# Patient Record
Sex: FEMALE | Race: WHITE | NOT HISPANIC OR LATINO | Employment: FULL TIME | URBAN - METROPOLITAN AREA
[De-identification: names, ages, dates, MRNs, and addresses within clinical notes are randomized per-mention and may not be internally consistent; named-entity substitution may affect disease eponyms.]

---

## 2018-06-07 PROCEDURE — 99283 EMERGENCY DEPT VISIT LOW MDM: CPT

## 2018-06-08 ENCOUNTER — HOSPITAL ENCOUNTER (EMERGENCY)
Facility: CLINIC | Age: 33
Discharge: HOME | End: 2018-06-08
Attending: EMERGENCY MEDICINE | Admitting: EMERGENCY MEDICINE
Payer: COMMERCIAL

## 2018-06-08 VITALS
HEIGHT: 65 IN | RESPIRATION RATE: 20 BRPM | SYSTOLIC BLOOD PRESSURE: 128 MMHG | BODY MASS INDEX: 22.66 KG/M2 | OXYGEN SATURATION: 97 % | DIASTOLIC BLOOD PRESSURE: 89 MMHG | WEIGHT: 136 LBS | TEMPERATURE: 97.8 F

## 2018-06-08 DIAGNOSIS — T78.40XA ALLERGIC REACTION, INITIAL ENCOUNTER: ICD-10-CM

## 2018-06-08 PROCEDURE — 25000132 ZZH RX MED GY IP 250 OP 250 PS 637: Performed by: EMERGENCY MEDICINE

## 2018-06-08 PROCEDURE — 25000131 ZZH RX MED GY IP 250 OP 636 PS 637: Performed by: EMERGENCY MEDICINE

## 2018-06-08 RX ORDER — EPINEPHRINE 0.3 MG/.3ML
0.3 INJECTION SUBCUTANEOUS
Qty: 0.6 ML | Refills: 0 | Status: SHIPPED | OUTPATIENT
Start: 2018-06-08

## 2018-06-08 RX ORDER — DIPHENHYDRAMINE HCL 25 MG
50 CAPSULE ORAL ONCE
Status: COMPLETED | OUTPATIENT
Start: 2018-06-08 | End: 2018-06-08

## 2018-06-08 RX ADMIN — DEXAMETHASONE 10 MG: 2 TABLET ORAL at 00:25

## 2018-06-08 RX ADMIN — DIPHENHYDRAMINE HYDROCHLORIDE 50 MG: 25 CAPSULE ORAL at 00:28

## 2018-06-08 RX ADMIN — RANITIDINE 150 MG: 150 TABLET ORAL at 00:25

## 2018-06-08 ASSESSMENT — ENCOUNTER SYMPTOMS
TROUBLE SWALLOWING: 1
SORE THROAT: 0

## 2018-06-08 NOTE — ED PROVIDER NOTES
"  History     Chief Complaint:  Oral swelling    HPI   Ainsley Tran is a 33 year old female who presents to the emergency department for evaluation of oral swelling. The patient reports that this morning she woke up with very swollen eyes but that it improved and is almost back to baseline at this point. Then, for the last three hours she experienced the onset of throat tightness and difficulty swallowing. The patient is currently breastfeeding a 13 month year old. She denies throat pain, seasonal allergies, any history of allergic reactions or chance of pregnancy. She notes she has not taken her Lexapro for the past two days.     Allergies:  No Known Allergies     Medications:    Epinephrine  Lexapro     Past Medical History:    Anxiety    Past Surgical History:        Family History:    No family history on file.    Social History:  Current every day smoker  Negative for alcohol use.      Review of Systems   HENT: Positive for trouble swallowing. Negative for sore throat.         Positive for Oral and Eye swelling   All other systems reviewed and are negative.      Physical Exam   First Vitals:  BP: 128/89  Heart Rate: 78  Temp: 97.8  F (36.6  C)  Resp: 20  Height: 165.1 cm (5' 5\")  Weight: 61.7 kg (136 lb)  SpO2: 100 %      Physical Exam  Constitutional:  Oriented to person, place, and time. Well appearing, normal.  HENT:    No angioedema.  Head:    Normocephalic.   Mouth/Throat:   Oropharynx is clear and moist.   Eyes:    EOM are normal. Pupils are equal, round, and reactive to light.   Neck:    Neck supple.   Cardiovascular:  Normal rate, regular rhythm and normal heart sounds.      Exam reveals no gallop and no friction rub.       No murmur heard.  Pulmonary/Chest:  Effort normal and breath sounds normal.      No respiratory distress. No wheezes. No rales.      No reproducible chest wall pain.  Abdominal:   Soft. No distension. No tenderness. No rebound and no guarding. "   Musculoskeletal:  Normal range of motion.   Neurological:   Alert and oriented to person, place, and time.           Moves all 4 extremities spontaneously    Skin:    No rash noted. No pallor.     Emergency Department Course   Interventions:  0025 Decadron 10 mg tablet PO  0025 Zantac 150 mg tablet PO  0028 Benadryl 50 mg PO     Emergency Department Course:  0013 Nursing notes and vitals reviewed. I performed an exam of the patient as documented above.     Medicine administered as documented above.     0110 I rechecked the patient and discussed the results of her workup thus far.     Findings and plan explained to the Patient. Patient discharged home with instructions regarding supportive care, medications, and reasons to return. The importance of close follow-up was reviewed. The patient was prescribed epinephrine.      Impression & Plan    Medical Decision Making:  Ainsley Tran is a 33 year old female came in with signs of an ongoing allergic reaction. I note no objective findings but after interventions she symptomatically feels greatly improved, not sure if her exposure is maybe environmental, she will be discharged with an epi pen and Benadryl and told to return with any worsening symptoms.      Diagnosis:    ICD-10-CM    1. Allergic reaction, initial encounter T78.40XA        Disposition:  discharged to home    Discharge Medications:  Discharge Medication List as of 6/8/2018  1:54 AM      START taking these medications    Details   EPINEPHrine (EPIPEN/ADRENACLICK/OR ANY BX GENERIC EQUIV) 0.3 MG/0.3ML injection 2-pack Inject 0.3 mLs (0.3 mg) into the muscle once as needed for anaphylaxis, Disp-0.6 mL, R-0, Local Print           Scribe Disclosure:  I, Harsha Schroeder, am serving as a scribe on 6/8/2018 at 12:13 AM to personally document services performed by Dr. Valentine MD based on my observations and the provider's statements to me.     Harsha Schroeder  6/7/2018   [facility] EMERGENCY  DEPARTMENT       Avery Grijalva MD  06/08/18 0682

## 2018-06-08 NOTE — ED TRIAGE NOTES
Pt awoke this am with bilateral eye edema but has resolved. 3 hours pt c/o throat tightness and oral swelling. Throat slightly reddened, no edema noted. ABC in tact. A/OX4

## 2018-06-08 NOTE — ED AVS SNAPSHOT
[facility] Emergency Department    [address] [place] [address]    Phone:  [phone]    Fax:  [phone]                                       Ainsley Tran   MRN: [id number]    Department:  [facility] Emergency Department   Date of Visit:  6/7/2018           Patient Information     Date Of Birth          1985        Your diagnoses for this visit were:     Allergic reaction, initial encounter        You were seen by Avery Grijalva MD.      Follow-up Information     Follow up with your doctor. Call in 1 week.        Discharge Instructions       Discharge Instructions  Allergic Reaction    An allergic reaction can result in a rash, itching, swelling, watery eyes, or a runny nose. A serious reaction can cause swelling of your mouth or throat, or wheezing. The most serious allergy is called analphylaxis, and can be life-threatening. Many allergies result in hives, also called urticaria.     An allergy happens when the body s natural defense system (immune system) overreacts to something harmless. The thing that triggers your allergic reaction is called an allergen. The first time you are exposed to your allergen, you may not have any reaction, but the body makes a protein called an antibody. The antibody lets the body recognize and remember the allergen.  Every time you are exposed to your allergen you get more antibody and your reaction can be more severe.      Call 911 if you have:    Swelling of the lips, tongue or throat.    Hoarse voice, drooling or trouble breathing.    Chest pain or shortness of breath.    Fainting or unconsciousness.    Return to the Emergency Department if:    You develop a fever.    You have significant abdominal pain.    You vomit more than once.    Your rash changes or looks very different.    What can I do to help myself?    If you know what caused your allergy, don t touch it, throw any of it away, and tell others not  to have it around you. Wear a medical alert bracelet with a name of your allergen on it.    If you don t know what you are allergic to, keep a journal of everything that you are exposed to (foods, soaps, medicines, etc.). Take this with you when you follow up with your primary doctor. This may help determine what is causing the allergic reaction.    Take any medicines that are prescribed.    Antihistamines can decrease rash or itching. You may use Benadryl  (diphenhydramine) for rash or itching according to package directions, or use a prescription antihistamine as recommended by your physician.    For significant allergic reactions, you may have been given an epinephrine (adrenaline) auto injector (EpiPen ). Carry this with you at all times! Use it if you are having any symptoms of anaphylaxis.  Do not be afraid to use it. Always call 911 if you use your EpiPen ! It is only meant to buy time until you can get to the Emergency Department!  If you were given a prescription for medicine here today, be sure to read all of the information (including the package insert) that comes with your prescription.  This will include important information about the medicine, its side effects, and any warnings that you need to know about.  The pharmacist who fills the prescription can provide more information and answer questions you may have about the medicine.  If you have questions or concerns that the pharmacist cannot address, please call or return to the Emergency Department.     Opioid Medication Information    Pain medications are among the most commonly prescribed medicines, so we are including this information for all our patients. If you did not receive pain medication or get a prescription for pain medicine, you can ignore it.     You may have been given a prescription for an opioid (narcotic) pain medicine and/or have received a pain medicine while here in the Emergency Department. These medicines can make you drowsy or  impaired. You must not drive, operate dangerous equipment, or engage in any other dangerous activities while taking these medications. If you drive while taking these medications, you could be arrested for DUI, or driving under the influence. Do not drink any alcohol while you are taking these medications.     Opioid pain medications can cause addiction. If you have a history of chemical dependency of any type, you are at a higher risk of becoming addicted to pain medications.  Only take these prescribed medications to treat your pain when all other options have been tried. Take it for as short a time and as few doses as possible. Store your pain pills in a secure place, as they are frequently stolen and provide a dangerous opportunity for children or visitors in your house to start abusing these powerful medications. We will not replace any lost or stolen medicine.  As soon as your pain is better, you should flush all your remaining medication.     Many prescription pain medications contain Tylenol  (acetaminophen), including Vicodin , Tylenol #3 , Norco , Lortab , and Percocet .  You should not take any extra pills of Tylenol  if you are using these prescription medications or you can get very sick.  Do not ever take more than 3000 mg of acetaminophen in any 24 hour period.    All opioids tend to cause constipation. Drink plenty of water and eat foods that have a lot of fiber, such as fruits, vegetables, prune juice, apple juice and high fiber cereal.  Take a laxative if you don t move your bowels at least every other day. Miralax , Milk of Magnesia, Colace , or Senna  can be used to keep you regular.      Remember that you can always come back to the Emergency Department if you are not able to see your regular doctor in the amount of time listed above, if you get any new symptoms, or if there is anything that worries you.        24 Hour Appointment Hotline       To make an appointment at any [facility], call  [phone] ([phone]). If you don't have a family doctor or clinic, we will help you find one. [facility] clinics are conveniently located to serve the needs of you and your family.             Review of your medicines      START taking        Dose / Directions Last dose taken    EPINEPHrine 0.3 MG/0.3ML injection 2-pack   Commonly known as:  EPIPEN/ADRENACLICK/or ANY BX GENERIC EQUIV   Dose:  0.3 mg   Quantity:  0.6 mL        Inject 0.3 mLs (0.3 mg) into the muscle once as needed for anaphylaxis   Refills:  0          Our records show that you are taking the medicines listed below. If these are incorrect, please call your family doctor or clinic.        Dose / Directions Last dose taken    LEXAPRO PO        Refills:  0                Prescriptions were sent or printed at these locations (1 Prescription)                   Other Prescriptions                Printed at Department/Unit printer (1 of 1)         EPINEPHrine (EPIPEN/ADRENACLICK/OR ANY BX GENERIC EQUIV) 0.3 MG/0.3ML injection 2-pack                Orders Needing Specimen Collection     None      Pending Results     No orders found from 6/6/2018 to 6/9/2018.            Pending Culture Results     No orders found from 6/6/2018 to 6/9/2018.            Pending Results Instructions     If you had any lab results that were not finalized at the time of your Discharge, you can call the ED Lab Result RN at [phone]. You will be contacted by this team for any positive Lab results or changes in treatment. The nurses are available 7 days a week from 10A to 6:30P.  You can leave a message 24 hours per day and they will return your call.        Test Results From Your Hospital Stay               Clinical Quality Measure: Blood Pressure Screening     Your blood pressure was checked while you were in the emergency department today. The last reading we obtained was  BP: 128/89 . Please read the guidelines below about what these numbers mean and what you should  "do about them.  If your systolic blood pressure (the top number) is less than 120 and your diastolic blood pressure (the bottom number) is less than 80, then your blood pressure is normal. There is nothing more that you need to do about it.  If your systolic blood pressure (the top number) is 120-139 or your diastolic blood pressure (the bottom number) is 80-89, your blood pressure may be higher than it should be. You should have your blood pressure rechecked within a year by a primary care provider.  If your systolic blood pressure (the top number) is 140 or greater or your diastolic blood pressure (the bottom number) is 90 or greater, you may have high blood pressure. High blood pressure is treatable, but if left untreated over time it can put you at risk for heart attack, stroke, or kidney failure. You should have your blood pressure rechecked by a primary care provider within the next 4 weeks.  If your provider in the emergency department today gave you specific instructions to follow-up with your doctor or provider even sooner than that, you should follow that instruction and not wait for up to 4 weeks for your follow-up visit.        Thank you for choosing [facility]       Thank you for choosing [facility] for your care. Our goal is always to provide you with excellent care. Hearing back from our patients is one way we can continue to improve our services. Please take a few minutes to complete the written survey that you may receive in the mail after you visit with us. Thank you!        [email or web address] Information     [employer] lets you send messages to your doctor, view your test results, renew your prescriptions, schedule appointments and more. To sign up, go to [email or web address] . Click on \"Log in\" on the left side of the screen, which will take you to the Welcome page. Then click on \"Sign up Now\" on the right side of the page.     You will be asked to enter the access code listed below, as well as some personal " information. Please follow the directions to create your username and password.     Your access code is: [email or web address]  Expires: 2018  1:52 AM     Your access code will  in 90 days. If you need help or a new code, please call your [facility] clinic or [phone].        Care EveryWhere ID     This is your Care EveryWhere ID. This could be used by other organizations to access your [facility] medical records  [id number]        Equal Access to Services     REBECA BUENO : Hadalma edwardo Somerrill, waaxda luqadaha, qaybta kaalmada adeleonel, maritza enciso. So [facility] [phone].    ATENCIÓN: Si habla español, tiene a paredes disposición servicios gratuitos de asistencia lingüística. Llame al [phone].    We comply with applicable federal civil rights laws and [place] laws. We do not discriminate on the basis of race, color, national origin, age, disability, sex, sexual orientation, or gender identity.            After Visit Summary       This is your record. Keep this with you and show to your community pharmacist(s) and doctor(s) at your next visit.

## 2018-06-08 NOTE — ED AVS SNAPSHOT
[facility] Emergency Department    [address]    [facility] [address]    Phone:  [phone]    Fax:  [phone]                                       Ainsley Tran   MRN: [id number]    Department:  [facility] Emergency Department   Date of Visit:  6/7/2018           After Visit Summary Signature Page     I have received my discharge instructions, and my questions have been answered. I have discussed any challenges I see with this plan with the nurse or doctor.    ..........................................................................................................................................  Patient/Patient Representative Signature      ..........................................................................................................................................  Patient Representative Print Name and Relationship to Patient    ..................................................               ................................................  Date                                            Time    ..........................................................................................................................................  Reviewed by Signature/Title    ...................................................              ..............................................  Date                                                            Time

## 2024-07-02 NOTE — DISCHARGE INSTRUCTIONS
Discharge Instructions  Allergic Reaction    An allergic reaction can result in a rash, itching, swelling, watery eyes, or a runny nose. A serious reaction can cause swelling of your mouth or throat, or wheezing. The most serious allergy is called analphylaxis, and can be life-threatening. Many allergies result in hives, also called urticaria.     An allergy happens when the body s natural defense system (immune system) overreacts to something harmless. The thing that triggers your allergic reaction is called an allergen. The first time you are exposed to your allergen, you may not have any reaction, but the body makes a protein called an antibody. The antibody lets the body recognize and remember the allergen.  Every time you are exposed to your allergen you get more antibody and your reaction can be more severe.      Call 911 if you have:    Swelling of the lips, tongue or throat.    Hoarse voice, drooling or trouble breathing.    Chest pain or shortness of breath.    Fainting or unconsciousness.    Return to the Emergency Department if:    You develop a fever.    You have significant abdominal pain.    You vomit more than once.    Your rash changes or looks very different.    What can I do to help myself?    If you know what caused your allergy, don t touch it, throw any of it away, and tell others not to have it around you. Wear a medical alert bracelet with a name of your allergen on it.    If you don t know what you are allergic to, keep a journal of everything that you are exposed to (foods, soaps, medicines, etc.). Take this with you when you follow up with your primary doctor. This may help determine what is causing the allergic reaction.    Take any medicines that are prescribed.    Antihistamines can decrease rash or itching. You may use Benadryl  (diphenhydramine) for rash or itching according to package directions, or use a prescription antihistamine as recommended by your physician.    For significant  allergic reactions, you may have been given an epinephrine (adrenaline) auto injector (EpiPen ). Carry this with you at all times! Use it if you are having any symptoms of anaphylaxis.  Do not be afraid to use it. Always call 911 if you use your EpiPen ! It is only meant to buy time until you can get to the Emergency Department!  If you were given a prescription for medicine here today, be sure to read all of the information (including the package insert) that comes with your prescription.  This will include important information about the medicine, its side effects, and any warnings that you need to know about.  The pharmacist who fills the prescription can provide more information and answer questions you may have about the medicine.  If you have questions or concerns that the pharmacist cannot address, please call or return to the Emergency Department.     Opioid Medication Information    Pain medications are among the most commonly prescribed medicines, so we are including this information for all our patients. If you did not receive pain medication or get a prescription for pain medicine, you can ignore it.     You may have been given a prescription for an opioid (narcotic) pain medicine and/or have received a pain medicine while here in the Emergency Department. These medicines can make you drowsy or impaired. You must not drive, operate dangerous equipment, or engage in any other dangerous activities while taking these medications. If you drive while taking these medications, you could be arrested for DUI, or driving under the influence. Do not drink any alcohol while you are taking these medications.     Opioid pain medications can cause addiction. If you have a history of chemical dependency of any type, you are at a higher risk of becoming addicted to pain medications.  Only take these prescribed medications to treat your pain when all other options have been tried. Take it for as short a time and as few  doses as possible. Store your pain pills in a secure place, as they are frequently stolen and provide a dangerous opportunity for children or visitors in your house to start abusing these powerful medications. We will not replace any lost or stolen medicine.  As soon as your pain is better, you should flush all your remaining medication.     Many prescription pain medications contain Tylenol  (acetaminophen), including Vicodin , Tylenol #3 , Norco , Lortab , and Percocet .  You should not take any extra pills of Tylenol  if you are using these prescription medications or you can get very sick.  Do not ever take more than 3000 mg of acetaminophen in any 24 hour period.    All opioids tend to cause constipation. Drink plenty of water and eat foods that have a lot of fiber, such as fruits, vegetables, prune juice, apple juice and high fiber cereal.  Take a laxative if you don t move your bowels at least every other day. Miralax , Milk of Magnesia, Colace , or Senna  can be used to keep you regular.      Remember that you can always come back to the Emergency Department if you are not able to see your regular doctor in the amount of time listed above, if you get any new symptoms, or if there is anything that worries you.       Detail Level: Detailed Quality 130: Documentation Of Current Medications In The Medical Record: Current Medications Documented

## 2024-08-23 ENCOUNTER — APPOINTMENT (OUTPATIENT)
Dept: MRI IMAGING | Facility: HOSPITAL | Age: 39
End: 2024-08-23
Attending: EMERGENCY MEDICINE
Payer: COMMERCIAL

## 2024-08-23 ENCOUNTER — HOSPITAL ENCOUNTER (EMERGENCY)
Facility: HOSPITAL | Age: 39
Discharge: HOME | End: 2024-08-23
Attending: EMERGENCY MEDICINE | Admitting: EMERGENCY MEDICINE
Payer: COMMERCIAL

## 2024-08-23 VITALS
HEART RATE: 70 BPM | BODY MASS INDEX: 25.99 KG/M2 | DIASTOLIC BLOOD PRESSURE: 64 MMHG | HEIGHT: 65 IN | WEIGHT: 156 LBS | RESPIRATION RATE: 16 BRPM | TEMPERATURE: 97 F | SYSTOLIC BLOOD PRESSURE: 100 MMHG | OXYGEN SATURATION: 95 %

## 2024-08-23 DIAGNOSIS — R20.2 PARESTHESIA: ICD-10-CM

## 2024-08-23 LAB
ALBUMIN UR-MCNC: NEGATIVE MG/DL
ANION GAP SERPL CALCULATED.3IONS-SCNC: 11 MMOL/L (ref 7–15)
APPEARANCE UR: CLEAR
BACTERIA #/AREA URNS HPF: ABNORMAL /HPF
BASOPHILS # BLD AUTO: 0 10E3/UL (ref 0–0.2)
BASOPHILS NFR BLD AUTO: 1 %
BILIRUB UR QL STRIP: NEGATIVE
BUN SERPL-MCNC: 10.8 MG/DL (ref 6–20)
CALCIUM SERPL-MCNC: 9.3 MG/DL (ref 8.8–10.4)
CHLORIDE SERPL-SCNC: 101 MMOL/L (ref 98–107)
COLOR UR AUTO: COLORLESS
CREAT SERPL-MCNC: 1.05 MG/DL (ref 0.51–0.95)
EGFRCR SERPLBLD CKD-EPI 2021: 70 ML/MIN/1.73M2
EOSINOPHIL # BLD AUTO: 0.2 10E3/UL (ref 0–0.7)
EOSINOPHIL NFR BLD AUTO: 2 %
ERYTHROCYTE [DISTWIDTH] IN BLOOD BY AUTOMATED COUNT: 12.9 % (ref 10–15)
GLUCOSE SERPL-MCNC: 133 MG/DL (ref 70–99)
GLUCOSE UR STRIP-MCNC: NEGATIVE MG/DL
HCO3 SERPL-SCNC: 29 MMOL/L (ref 22–29)
HCT VFR BLD AUTO: 42.1 % (ref 35–47)
HGB BLD-MCNC: 14.8 G/DL (ref 11.7–15.7)
HGB UR QL STRIP: NEGATIVE
HOLD SPECIMEN: NORMAL
HOLD SPECIMEN: NORMAL
IMM GRANULOCYTES # BLD: 0 10E3/UL
IMM GRANULOCYTES NFR BLD: 1 %
KETONES UR STRIP-MCNC: NEGATIVE MG/DL
LEUKOCYTE ESTERASE UR QL STRIP: NEGATIVE
LYMPHOCYTES # BLD AUTO: 2.9 10E3/UL (ref 0.8–5.3)
LYMPHOCYTES NFR BLD AUTO: 43 %
MCH RBC QN AUTO: 32.3 PG (ref 26.5–33)
MCHC RBC AUTO-ENTMCNC: 35.2 G/DL (ref 31.5–36.5)
MCV RBC AUTO: 92 FL (ref 78–100)
MONOCYTES # BLD AUTO: 0.4 10E3/UL (ref 0–1.3)
MONOCYTES NFR BLD AUTO: 6 %
NEUTROPHILS # BLD AUTO: 3.1 10E3/UL (ref 1.6–8.3)
NEUTROPHILS NFR BLD AUTO: 48 %
NITRATE UR QL: NEGATIVE
NRBC # BLD AUTO: 0 10E3/UL
NRBC BLD AUTO-RTO: 0 /100
PH UR STRIP: 8 [PH] (ref 5–7)
PLATELET # BLD AUTO: 255 10E3/UL (ref 150–450)
POTASSIUM SERPL-SCNC: 3.7 MMOL/L (ref 3.4–5.3)
RBC # BLD AUTO: 4.58 10E6/UL (ref 3.8–5.2)
RBC URINE: <1 /HPF
SODIUM SERPL-SCNC: 141 MMOL/L (ref 135–145)
SP GR UR STRIP: 1.01 (ref 1–1.03)
SQUAMOUS EPITHELIAL: 1 /HPF
TROPONIN T SERPL HS-MCNC: 14 NG/L
UROBILINOGEN UR STRIP-MCNC: <2 MG/DL
WBC # BLD AUTO: 6.6 10E3/UL (ref 4–11)
WBC URINE: <1 /HPF

## 2024-08-23 PROCEDURE — 82746 ASSAY OF FOLIC ACID SERUM: CPT | Performed by: EMERGENCY MEDICINE

## 2024-08-23 PROCEDURE — 84484 ASSAY OF TROPONIN QUANT: CPT | Performed by: STUDENT IN AN ORGANIZED HEALTH CARE EDUCATION/TRAINING PROGRAM

## 2024-08-23 PROCEDURE — A9585 GADOBUTROL INJECTION: HCPCS | Performed by: EMERGENCY MEDICINE

## 2024-08-23 PROCEDURE — 80048 BASIC METABOLIC PNL TOTAL CA: CPT | Performed by: STUDENT IN AN ORGANIZED HEALTH CARE EDUCATION/TRAINING PROGRAM

## 2024-08-23 PROCEDURE — 99285 EMERGENCY DEPT VISIT HI MDM: CPT | Mod: 25

## 2024-08-23 PROCEDURE — 84484 ASSAY OF TROPONIN QUANT: CPT | Performed by: EMERGENCY MEDICINE

## 2024-08-23 PROCEDURE — 85025 COMPLETE CBC W/AUTO DIFF WBC: CPT | Performed by: EMERGENCY MEDICINE

## 2024-08-23 PROCEDURE — 70544 MR ANGIOGRAPHY HEAD W/O DYE: CPT | Mod: XU

## 2024-08-23 PROCEDURE — 81001 URINALYSIS AUTO W/SCOPE: CPT | Performed by: EMERGENCY MEDICINE

## 2024-08-23 PROCEDURE — 85025 COMPLETE CBC W/AUTO DIFF WBC: CPT | Performed by: STUDENT IN AN ORGANIZED HEALTH CARE EDUCATION/TRAINING PROGRAM

## 2024-08-23 PROCEDURE — 70549 MR ANGIOGRAPH NECK W/O&W/DYE: CPT

## 2024-08-23 PROCEDURE — 93005 ELECTROCARDIOGRAM TRACING: CPT | Performed by: STUDENT IN AN ORGANIZED HEALTH CARE EDUCATION/TRAINING PROGRAM

## 2024-08-23 PROCEDURE — 36415 COLL VENOUS BLD VENIPUNCTURE: CPT | Performed by: EMERGENCY MEDICINE

## 2024-08-23 PROCEDURE — 36415 COLL VENOUS BLD VENIPUNCTURE: CPT | Performed by: STUDENT IN AN ORGANIZED HEALTH CARE EDUCATION/TRAINING PROGRAM

## 2024-08-23 PROCEDURE — 84207 ASSAY OF VITAMIN B-6: CPT | Performed by: EMERGENCY MEDICINE

## 2024-08-23 PROCEDURE — 255N000002 HC RX 255 OP 636: Performed by: EMERGENCY MEDICINE

## 2024-08-23 PROCEDURE — 80048 BASIC METABOLIC PNL TOTAL CA: CPT | Performed by: EMERGENCY MEDICINE

## 2024-08-23 PROCEDURE — 70553 MRI BRAIN STEM W/O & W/DYE: CPT

## 2024-08-23 PROCEDURE — 82607 VITAMIN B-12: CPT | Performed by: EMERGENCY MEDICINE

## 2024-08-23 PROCEDURE — 93005 ELECTROCARDIOGRAM TRACING: CPT | Performed by: EMERGENCY MEDICINE

## 2024-08-23 RX ORDER — GADOBUTROL 604.72 MG/ML
7.5 INJECTION INTRAVENOUS ONCE
Status: COMPLETED | OUTPATIENT
Start: 2024-08-23 | End: 2024-08-23

## 2024-08-23 RX ADMIN — GADOBUTROL 7.5 ML: 604.72 INJECTION INTRAVENOUS at 21:15

## 2024-08-23 ASSESSMENT — ENCOUNTER SYMPTOMS
DIFFICULTY URINATING: 0
FACIAL SWELLING: 1
CONSTIPATION: 0
HEADACHES: 1
DIZZINESS: 1
APPETITE CHANGE: 0
NUMBNESS: 1
FATIGUE: 1
FEVER: 0
DYSURIA: 0
COUGH: 0
FREQUENCY: 0
CONFUSION: 1
HEMATURIA: 0
WEAKNESS: 1
CHILLS: 0
DIARRHEA: 0
SPEECH DIFFICULTY: 0

## 2024-08-23 ASSESSMENT — COLUMBIA-SUICIDE SEVERITY RATING SCALE - C-SSRS
6. HAVE YOU EVER DONE ANYTHING, STARTED TO DO ANYTHING, OR PREPARED TO DO ANYTHING TO END YOUR LIFE?: NO
1. IN THE PAST MONTH, HAVE YOU WISHED YOU WERE DEAD OR WISHED YOU COULD GO TO SLEEP AND NOT WAKE UP?: NO
2. HAVE YOU ACTUALLY HAD ANY THOUGHTS OF KILLING YOURSELF IN THE PAST MONTH?: NO

## 2024-08-23 ASSESSMENT — ACTIVITIES OF DAILY LIVING (ADL)
ADLS_ACUITY_SCORE: 35

## 2024-08-23 NOTE — ED TRIAGE NOTES
Pt reports 6 days weakness, also transient Numbness tingling to arms and legs when sleeping, pt reports dizziness and shortness of breath also noted tongue swelling and partner notices mild bilat cheek swelling.  Pt denies difficulty swallowing.    Pt did have contact with mother who recently travelled to [place] and mother returned with weakness and cough.    Covid test negative at clinic today and negative test at home in past days.     Triage Assessment (Adult)       Row Name 08/23/24 3174          Triage Assessment    Airway WDL WDL        Respiratory WDL    Respiratory WDL WDL        Skin Circulation/Temperature WDL    Skin Circulation/Temperature WDL WDL        Cardiac WDL    Cardiac WDL WDL        Peripheral/Neurovascular WDL    Peripheral Neurovascular WDL WDL        Cognitive/Neuro/Behavioral WDL    Cognitive/Neuro/Behavioral WDL WDL

## 2024-08-24 LAB
ATRIAL RATE - MUSE: 78 BPM
DIASTOLIC BLOOD PRESSURE - MUSE: NORMAL MMHG
FOLATE SERPL-MCNC: 8.6 NG/ML (ref 4.6–34.8)
INTERPRETATION ECG - MUSE: NORMAL
P AXIS - MUSE: 51 DEGREES
PR INTERVAL - MUSE: 164 MS
QRS DURATION - MUSE: 84 MS
QT - MUSE: 374 MS
QTC - MUSE: 426 MS
R AXIS - MUSE: 64 DEGREES
SYSTOLIC BLOOD PRESSURE - MUSE: NORMAL MMHG
T AXIS - MUSE: 251 DEGREES
VENTRICULAR RATE- MUSE: 78 BPM
VIT B12 SERPL-MCNC: 569 PG/ML (ref 232–1245)

## 2024-08-24 NOTE — ED PROVIDER NOTES
EMERGENCY DEPARTMENT ENCOUNTER       ED Course & Medical Decision Making     7:22 PM I introduced myself to the patient, obtained patient history, performed a physical exam, and discussed plan for ED workup including potential diagnostic laboratory/imaging studies and interventions.      I saw and examined the patient.  She has a normal, nonfocal neurologic exam currently.  She states that right now she is not feeling any numbness and tingling but she did have areas of numbness on her upper and lower extremities migratory in nature over the last week or so.    I did send off B6, B12 and folate studies which are pending.      She has not had any significant signs of infection.  No fevers.  No neck stiffness or signs of meningitis.  This does seem to be neurologic in nature and I will send her for an MRI/MRI, however this returned unremarkable.  She did have a potential Chiari malformation noted but I do not feel that this explains her symptoms.  Her laboratory studies that I did get back returned unremarkable.      This is a reassuring workup from the emergency department but it does not explain her symptoms.  I do not feel that she requires admission but she should follow-up with neurology.  Consultation has been placed with referral.  I do feel that this can be done as an outpatient and she will be discharged to follow-up with neurology, however if any of her symptoms worsen or she develops new or worrisome symptoms she would return here for reevaluation    I recommended taking a multivitamin in the meantime until she is cleared by neurology    Medical Decision Making  Obtained supplemental history:Supplemental history obtained?: No  Reviewed external records: External records reviewed?: No  Care impacted by chronic illness:Other: hypothyroidism  Care significantly affected by social determinants of health:N/A  Did you consider but not order tests?: Work up considered but not performed and documented in chart, if  applicable  Did you interpret images independently?: Independent interpretation of ECG and images noted in documentation, when applicable.  Consultation discussion with other provider:Did you involve another provider (consultant, , pharmacy, etc.)?: No  Discharge. No recommendations on prescription strength medication(s). See documentation for any additional details.        Prior to making a final disposition on this patient the results of patient's tests and other diagnostic studies were discussed with the patient. All questions were answered. Patient expressed understanding of the plan and was amenable to it.    Medications   gadobutrol (GADAVIST) injection 7.5 mL (7.5 mLs Intravenous $Given 8/23/24 6038)       Final Impression     1. Paresthesia            Chief Complaint     Chief Complaint   Patient presents with    Dizziness       Pt reports 6 days weakness, also transient Numbness tingling to arms and legs when sleeping, pt reports dizziness and shortness of breath also noted tongue swelling and partner notices mild bilat cheek swelling.  Pt denies difficulty swallowing.    Pt did have contact with mother who recently travelled to [place] and mother returned with weakness and cough.    Covid test negative at clinic today and negative test at home in past days.     Triage Assessment (Adult)       Row Name 08/23/24 1617          Triage Assessment    Airway WDL WDL        Respiratory WDL    Respiratory WDL WDL        Skin Circulation/Temperature WDL    Skin Circulation/Temperature WDL WDL        Cardiac WDL    Cardiac WDL WDL        Peripheral/Neurovascular WDL    Peripheral Neurovascular WDL WDL        Cognitive/Neuro/Behavioral WDL    Cognitive/Neuro/Behavioral WDL WDL                         HPI       Mery Tran is a pleasant 39 year old female presents with 1 week of generalized weakness, fatgiue, intermittent dizziness, brain fog, headaches, intermittent numbness of her extremities, and facial  swelling. She states her symptoms all started after she had a migraine on 8/17. Since her symptoms first started they have been consistent or worsening, but have not gotten any better. A couple days ago she had some numbness in her right leg, but the numbness has not been constant and at other times her other extremities feel numb. She denies any history of similar symptoms.    She denies any cough, chills, fever, appetite change, urinary symptoms, stool changes, vision changes, or speech changes.      I, Morris SOOD Lay am serving as a scribe to document services personally performed by Cesar Acuña M.D. based on my observation and the provider's statements to me. ICesar M.D attest that Morris Lay is acting in a scribe capacity, has observed my performance of the services and has documented them in accordance with my direction.    Past Medical History     No past medical history on file.  No past surgical history on file.  No family history on file.        Relevant past medical, surgical, family and social history as documented above, has been reviewed and discussed with patient. No changes or additions, unless otherwise noted in the HPI.    Current Medications     No current outpatient medications on file.      Allergies     No Known Allergies    Review of Systems     Review of Systems   Constitutional:  Positive for fatigue. Negative for appetite change, chills and fever.   HENT:  Positive for facial swelling.    Eyes:  Negative for visual disturbance.   Respiratory:  Negative for cough.    Gastrointestinal:  Negative for constipation and diarrhea.   Genitourinary:  Negative for difficulty urinating, dysuria, frequency, hematuria and urgency.   Neurological:  Positive for dizziness, weakness, numbness and headaches. Negative for speech difficulty.   Psychiatric/Behavioral:  Positive for confusion.         Remainder of systems reviewed, unless noted in HPI all others negative.    Physical  "Exam     /64   Pulse 70   Temp 97  F (36.1  C) (Temporal)   Resp 16   Ht 1.651 m (5' 5\")   Wt 70.8 kg (156 lb)   SpO2 95%   BMI 25.96 kg/m      Physical Exam  Vitals and nursing note reviewed.   HENT:      Head: Normocephalic.      Comments: No significant tongue or oropharyngeal swelling or asymmetry.     Nose: Nose normal.   Cardiovascular:      Rate and Rhythm: Normal rate.   Pulmonary:      Effort: Pulmonary effort is normal.      Breath sounds: No stridor.   Neurological:      General: No focal deficit present.      Mental Status: She is alert and oriented to person, place, and time. Mental status is at baseline.      Cranial Nerves: No cranial nerve deficit.      Sensory: No sensory deficit.      Motor: No weakness.      Coordination: Coordination normal.      Gait: Gait normal.   Psychiatric:         Mood and Affect: Mood normal.             Labs & Imaging         Labs Ordered and Resulted from Time of ED Arrival to Time of ED Departure   BASIC METABOLIC PANEL - Abnormal       Result Value    Sodium 141      Potassium 3.7      Chloride 101      Carbon Dioxide (CO2) 29      Anion Gap 11      Urea Nitrogen 10.8      Creatinine 1.05 (*)     GFR Estimate 70      Calcium 9.3      Glucose 133 (*)    ROUTINE UA WITH MICROSCOPIC REFLEX TO CULTURE - Abnormal    Color Urine Colorless      Appearance Urine Clear      Glucose Urine Negative      Bilirubin Urine Negative      Ketones Urine Negative      Specific Gravity Urine 1.008      Blood Urine Negative      pH Urine 8.0 (*)     Protein Albumin Urine Negative      Urobilinogen Urine <2.0      Nitrite Urine Negative      Leukocyte Esterase Urine Negative      Bacteria Urine Few (*)     RBC Urine <1      WBC Urine <1      Squamous Epithelials Urine 1     TROPONIN T, HIGH SENSITIVITY - Normal    Troponin T, High Sensitivity 14     CBC WITH PLATELETS AND DIFFERENTIAL    WBC Count 6.6      RBC Count 4.58      Hemoglobin 14.8      Hematocrit 42.1      MCV 92   "    MCH 32.3      MCHC 35.2      RDW 12.9      Platelet Count 255      % Neutrophils 48      % Lymphocytes 43      % Monocytes 6      % Eosinophils 2      % Basophils 1      % Immature Granulocytes 1      NRBCs per 100 WBC 0      Absolute Neutrophils 3.1      Absolute Lymphocytes 2.9      Absolute Monocytes 0.4      Absolute Eosinophils 0.2      Absolute Basophils 0.0      Absolute Immature Granulocytes 0.0      Absolute NRBCs 0.0     VITAMIN B12   FOLATE   VITAMIN B6         Results for orders placed or performed during the hospital encounter of 08/23/24   MR Brain w/o & w Contrast    Impression    IMPRESSION:  HEAD MRI:   1.  No acute intracranial process.  2.  Mild tonsillar ectopia suggestive of Chiari I.    HEAD MRA:   1.  Normal MRA [place].    NECK MRA:  1.  Normal neck MRA.   MRA Angiogram Head w/o Contrast    Impression    IMPRESSION:  HEAD MRI:   1.  No acute intracranial process.  2.  Mild tonsillar ectopia suggestive of Chiari I.    HEAD MRA:   1.  Normal MRA [place].    NECK MRA:  1.  Normal neck MRA.   MRA Angiogram Neck w/o & w Contrast    Impression    IMPRESSION:  HEAD MRI:   1.  No acute intracranial process.  2.  Mild tonsillar ectopia suggestive of Chiari I.    HEAD MRA:   1.  Normal MRA [place].    NECK MRA:  1.  Normal neck MRA.   Basic metabolic panel   Result Value Ref Range    Sodium 141 135 - 145 mmol/L    Potassium 3.7 3.4 - 5.3 mmol/L    Chloride 101 98 - 107 mmol/L    Carbon Dioxide (CO2) 29 22 - 29 mmol/L    Anion Gap 11 7 - 15 mmol/L    Urea Nitrogen 10.8 6.0 - 20.0 mg/dL    Creatinine 1.05 (H) 0.51 - 0.95 mg/dL    GFR Estimate 70 >60 mL/min/1.73m2    Calcium 9.3 8.8 - 10.4 mg/dL    Glucose 133 (H) 70 - 99 mg/dL   Result Value Ref Range    Troponin T, High Sensitivity 14 <=14 ng/L   CBC with platelets and differential   Result Value Ref Range    WBC Count 6.6 4.0 - 11.0 10e3/uL    RBC Count 4.58 3.80 - 5.20 10e6/uL    Hemoglobin 14.8 11.7 - 15.7 g/dL     Hematocrit 42.1 35.0 - 47.0 %    MCV 92 78 - 100 fL    MCH 32.3 26.5 - 33.0 pg    MCHC 35.2 31.5 - 36.5 g/dL    RDW 12.9 10.0 - 15.0 %    Platelet Count 255 150 - 450 10e3/uL    % Neutrophils 48 %    % Lymphocytes 43 %    % Monocytes 6 %    % Eosinophils 2 %    % Basophils 1 %    % Immature Granulocytes 1 %    NRBCs per 100 WBC 0 <1 /100    Absolute Neutrophils 3.1 1.6 - 8.3 10e3/uL    Absolute Lymphocytes 2.9 0.8 - 5.3 10e3/uL    Absolute Monocytes 0.4 0.0 - 1.3 10e3/uL    Absolute Eosinophils 0.2 0.0 - 0.7 10e3/uL    Absolute Basophils 0.0 0.0 - 0.2 10e3/uL    Absolute Immature Granulocytes 0.0 <=0.4 10e3/uL    Absolute NRBCs 0.0 10e3/uL   Extra Blue Top Tube   Result Value Ref Range    Hold Specimen JIC    Extra Red Top Tube   Result Value Ref Range    Hold Specimen JIC    UA with Microscopic reflex to Culture    Specimen: Urine, Clean Catch   Result Value Ref Range    Color Urine Colorless Colorless, Straw, Light Yellow, Yellow    Appearance Urine Clear Clear    Glucose Urine Negative Negative mg/dL    Bilirubin Urine Negative Negative    Ketones Urine Negative Negative mg/dL    Specific Gravity Urine 1.008 1.001 - 1.030    Blood Urine Negative Negative    pH Urine 8.0 (H) 5.0 - 7.0    Protein Albumin Urine Negative Negative mg/dL    Urobilinogen Urine <2.0 <2.0 mg/dL    Nitrite Urine Negative Negative    Leukocyte Esterase Urine Negative Negative    Bacteria Urine Few (A) None Seen /HPF    RBC Urine <1 <=2 /HPF    WBC Urine <1 <=5 /HPF    Squamous Epithelials Urine 1 <=1 /HPF       Cesar Acuña MD  Emergency Medicine  [facility] EMERGENCY DEPARTMENT  [address] [facility] [address] [phone]  8/23/2024       Cesar Acuña MD  08/23/24 0007

## 2024-08-27 LAB — PYRIDOXAL PHOS SERPL-SCNC: 36.4 NMOL/L

## 2024-09-19 ENCOUNTER — HOSPITAL ENCOUNTER (EMERGENCY)
Facility: HOSPITAL | Age: 39
Discharge: HOME | End: 2024-09-20
Payer: COMMERCIAL

## 2024-09-19 DIAGNOSIS — R07.9 CHEST PAIN, UNSPECIFIED TYPE: ICD-10-CM

## 2024-09-19 DIAGNOSIS — R06.02 SHORTNESS OF BREATH: ICD-10-CM

## 2024-09-19 LAB
BASOPHILS # BLD AUTO: 0.1 10E3/UL (ref 0–0.2)
BASOPHILS NFR BLD AUTO: 1 %
EOSINOPHIL # BLD AUTO: 0.1 10E3/UL (ref 0–0.7)
EOSINOPHIL NFR BLD AUTO: 2 %
ERYTHROCYTE [DISTWIDTH] IN BLOOD BY AUTOMATED COUNT: 12.6 % (ref 10–15)
HCG SERPL QL: NEGATIVE
HCT VFR BLD AUTO: 35.9 % (ref 35–47)
HGB BLD-MCNC: 12.4 G/DL (ref 11.7–15.7)
IMM GRANULOCYTES # BLD: 0 10E3/UL
IMM GRANULOCYTES NFR BLD: 0 %
LYMPHOCYTES # BLD AUTO: 2.8 10E3/UL (ref 0.8–5.3)
LYMPHOCYTES NFR BLD AUTO: 44 %
MCH RBC QN AUTO: 32 PG (ref 26.5–33)
MCHC RBC AUTO-ENTMCNC: 34.5 G/DL (ref 31.5–36.5)
MCV RBC AUTO: 93 FL (ref 78–100)
MONOCYTES # BLD AUTO: 0.4 10E3/UL (ref 0–1.3)
MONOCYTES NFR BLD AUTO: 6 %
NEUTROPHILS # BLD AUTO: 3 10E3/UL (ref 1.6–8.3)
NEUTROPHILS NFR BLD AUTO: 47 %
NRBC # BLD AUTO: 0 10E3/UL
NRBC BLD AUTO-RTO: 0 /100
PLATELET # BLD AUTO: 220 10E3/UL (ref 150–450)
RBC # BLD AUTO: 3.87 10E6/UL (ref 3.8–5.2)
WBC # BLD AUTO: 6.4 10E3/UL (ref 4–11)

## 2024-09-19 PROCEDURE — 99285 EMERGENCY DEPT VISIT HI MDM: CPT | Mod: 25

## 2024-09-19 PROCEDURE — 36415 COLL VENOUS BLD VENIPUNCTURE: CPT

## 2024-09-19 PROCEDURE — 84484 ASSAY OF TROPONIN QUANT: CPT

## 2024-09-19 PROCEDURE — 93005 ELECTROCARDIOGRAM TRACING: CPT

## 2024-09-19 PROCEDURE — 80048 BASIC METABOLIC PNL TOTAL CA: CPT

## 2024-09-19 PROCEDURE — 85025 COMPLETE CBC W/AUTO DIFF WBC: CPT

## 2024-09-19 PROCEDURE — 93005 ELECTROCARDIOGRAM TRACING: CPT | Performed by: STUDENT IN AN ORGANIZED HEALTH CARE EDUCATION/TRAINING PROGRAM

## 2024-09-19 PROCEDURE — 83735 ASSAY OF MAGNESIUM: CPT

## 2024-09-19 PROCEDURE — 84703 CHORIONIC GONADOTROPIN ASSAY: CPT

## 2024-09-19 ASSESSMENT — ACTIVITIES OF DAILY LIVING (ADL): ADLS_ACUITY_SCORE: 35

## 2024-09-20 ENCOUNTER — APPOINTMENT (OUTPATIENT)
Dept: RADIOLOGY | Facility: HOSPITAL | Age: 39
End: 2024-09-20
Payer: COMMERCIAL

## 2024-09-20 VITALS
BODY MASS INDEX: 25.49 KG/M2 | OXYGEN SATURATION: 99 % | WEIGHT: 153 LBS | DIASTOLIC BLOOD PRESSURE: 68 MMHG | HEART RATE: 55 BPM | RESPIRATION RATE: 15 BRPM | TEMPERATURE: 97.7 F | SYSTOLIC BLOOD PRESSURE: 100 MMHG | HEIGHT: 65 IN

## 2024-09-20 LAB
ANION GAP SERPL CALCULATED.3IONS-SCNC: 10 MMOL/L (ref 7–15)
BUN SERPL-MCNC: 10.4 MG/DL (ref 6–20)
CALCIUM SERPL-MCNC: 9.5 MG/DL (ref 8.8–10.4)
CHLORIDE SERPL-SCNC: 103 MMOL/L (ref 98–107)
CREAT SERPL-MCNC: 1.24 MG/DL (ref 0.51–0.95)
EGFRCR SERPLBLD CKD-EPI 2021: 57 ML/MIN/1.73M2
GLUCOSE SERPL-MCNC: 105 MG/DL (ref 70–99)
HCO3 SERPL-SCNC: 27 MMOL/L (ref 22–29)
MAGNESIUM SERPL-MCNC: 2.2 MG/DL (ref 1.7–2.3)
POTASSIUM SERPL-SCNC: 3.5 MMOL/L (ref 3.4–5.3)
SODIUM SERPL-SCNC: 140 MMOL/L (ref 135–145)
TROPONIN T SERPL HS-MCNC: 17 NG/L
TROPONIN T SERPL HS-MCNC: 18 NG/L

## 2024-09-20 PROCEDURE — 71046 X-RAY EXAM CHEST 2 VIEWS: CPT

## 2024-09-20 PROCEDURE — 84484 ASSAY OF TROPONIN QUANT: CPT

## 2024-09-20 PROCEDURE — 36415 COLL VENOUS BLD VENIPUNCTURE: CPT

## 2024-09-20 PROCEDURE — 250N000013 HC RX MED GY IP 250 OP 250 PS 637

## 2024-09-20 RX ORDER — ASPIRIN 325 MG
325 TABLET ORAL ONCE
Status: COMPLETED | OUTPATIENT
Start: 2024-09-20 | End: 2024-09-20

## 2024-09-20 RX ADMIN — ASPIRIN 325 MG ORAL TABLET 325 MG: 325 PILL ORAL at 00:58

## 2024-09-20 ASSESSMENT — ACTIVITIES OF DAILY LIVING (ADL)
ADLS_ACUITY_SCORE: 35
ADLS_ACUITY_SCORE: 35

## 2024-09-20 NOTE — ED PROVIDER NOTES
EMERGENCY DEPARTMENT ENCOUNTER      NAME: Ainsley Tran  AGE: 39 year old female  YOB: 1985  MRN: [id number]  EVALUATION DATE & TIME: 9/19/2024 10:52 PM    PCP: Lupe Bautista    ED PROVIDER: Nii Nelson M.D.      Chief Complaint   Patient presents with    Shortness of Breath    Chest Pain         FINAL IMPRESSION:  1. Chest pain, unspecified type    2. Shortness of breath          ED COURSE & MEDICAL DECISION MAKING:    Pertinent Labs & Imaging studies reviewed. (See chart for details)  39 year old female presents to the Emergency Department for evaluation of chest pain and shortness of breath .  Differential diagnosis considered pulmonary embolism, aortic dissection, ACS, pneumothorax, esophageal rupture.     ED Course as of 09/20/24 0217   Thu Sep 19, 2024   2104 I met with the patient, obtained history, performed an initial exam, and discussed options and plan for diagnostics and treatment here in the ED.    2312 Patient presenting with chest pain and shortness of breath started 1 hour ago.  Is described as pressure.  Not associate with diaphoresis, worsening with exertion or vomiting.  She does smoke but does not have other cardiovascular risk factors.  She is well-appearing and nontoxic with reassuring vital signs.  Will perform cardiac workup and a chest x-ray.  Low concern for PE as she is PERC negative.  Description is not consistent with aortic dissection.   2342 EKG shows nonspecific T wave abnormalities T wave inversions in the inferior leads without significant ST elevation.  Nonspecific.  Does not show signs of arrhythmia.  Specifically no delta waves, epsilon waves, dagger Q waves or signs of heart block.   Fri Sep 20, 2024   0031 Troponin T, High Sensitivity(!): 18  Troponin mildly elevated.  Will have a repeat delta troponin.   0033 Heart score is currently 3.  If delta troponin does not rise will discharge home with cardiology follow-up.   0040 I am updating the  patient. Her chest pain has improved.    0210 Troponin did not significantly increase.  Given patient's low heart score and resolution of chest pain will discharge home and follow-up with her PCP and we will order a cardiology referral.  Patient is agreeable with plan.   0210 XR Chest 2 Views  Chest x-ray which was individually interpreted by myself did not show large pneumothorax.  Official radiology report showed a normal chest x-ray       Not Applicable    I discussed the plan for discharge with the patient, and patient is agreeable. We discussed supportive cares at home and reasons for return to the ER including new or worsening symptoms - all questions and concerns addressed to the best of my ability. Strict return precautions discussed. Patient to be discharged by RN.    At the conclusion of the encounter I discussed the results of all of the tests and the disposition. The questions were answered. The patient or family acknowledged understanding and was agreeable with the care plan.     MEDICATIONS GIVEN IN THE EMERGENCY:  Medications   aspirin (ASA) tablet 325 mg (325 mg Oral $Given 9/20/24 0058)       NEW PRESCRIPTIONS STARTED AT TODAY'S ER VISIT  New Prescriptions    No medications on file     Modified Medications    No medications on file       =================================================================    HPI    Patient information was obtained from: the patient    Use of : N/A         Ainsley Hollie Tran is a 39 year old female with a pertinent history of thyroid cancer, anemia and generalized anxiety disorder who presents to this ED for evaluation of chest pressure and shortness of breath.     The patient was having a dinner date with her  at around 10:00 PM [facility] (09/19) when she developed chest pressure, shortness of breath, shoulder and back pain, dizziness with ambulating and numbness and weakness in her arms. Of note, the arm numbness and weakness has been an  "long-term issue. She states that it \"feels like something is sitting on my chest\". Her symptoms have been ongoing since their onset. She has experienced these symptoms before, but they were not persistent and resolved on their own. Prior to coming into the emergency department, her blood pressure was low although this has normalized as of now. She feels as if she is having palpitations although she is not tachycardic. The patient adds that she has had ongoing bilateral knee and foot swelling for the past month.     She otherwise denies diaphoresis, emesis, syncope, chest pain, abdominal pain or new leg swelling. No known medical problems, bleeding or clotting disorders, use of anticoagulants or past blood clots.     The patient has been smoking tobacco for 20 years. She rarely consumes alcohol and did not have any tonight.     Per Chart Review, the patient was seen on 09/17/2024 at [facility] for evaluation of six months of facial fullness, tongue swelling and mild dysphagia. No family history of angioedema. No signs of tongue or lip enlargement on exam; however, she did have bilateral fullness and swelling to her cheeks and mandibles with associated lymphadenopathy. Lymphadenopathy was seen on multiple visits and stable on several prior ultrasounds. She was prescribed Prednisone 20 mg two tablets daily for five days.     Ultrasound neck/head soft tissue no thyroid from 09/19/2024 with parotid and submandibular glands symmetric in appearance on both sides, no obvious salivary duct dilatation and no lymphadenopathy within the neck.       PHYSICAL EXAM    BP 96/65 (BP Location: Right arm, Patient Position: Semi-Godinez's)   Pulse 53   Temp 97.7  F (36.5  C) (Oral)   Resp 13   Ht 1.651 m (5' 5\")   Wt 69.4 kg (153 lb)   SpO2 96%   Breastfeeding No   BMI 25.46 kg/m      Constitutional: Well developed, well nourished. Comfortable appearing.   Head: Normocephalic, atraumatic, mucous membranes " moist, nose normal.   Neck: Supple, gross ROM intact.   Eyes: Pupils mid-range, sclera white.  Respiratory: Clear to auscultation bilaterally, no respiratory distress, no wheezing, speaks full sentences easily.  Cardiovascular: Normal heart rate, regular rhythm, no murmurs. No lower extremity edema. No pitting edema.   GI: Soft, no tenderness to deep palpation in all quadrants.  Musculoskeletal: Moving all 4 extremities intentionally and without pain. No obvious deformity. No calf tenderness or swelling.  No truncal ataxia.   Skin: Warm, dry, no rash.  Neurologic: Alert & oriented x 3, speech clear, moving all extremities spontaneously.   Psychiatric: Affect normal, cooperative.       LAB:  All pertinent labs reviewed and interpreted.  Results for orders placed or performed during the hospital encounter of 09/19/24   XR Chest 2 Views    Impression    IMPRESSION: Lungs are clear. No pleural effusion or pneumothorax. Normal heart size and pulmonary vascularity.   Basic metabolic panel   Result Value Ref Range    Sodium 140 135 - 145 mmol/L    Potassium 3.5 3.4 - 5.3 mmol/L    Chloride 103 98 - 107 mmol/L    Carbon Dioxide (CO2) 27 22 - 29 mmol/L    Anion Gap 10 7 - 15 mmol/L    Urea Nitrogen 10.4 6.0 - 20.0 mg/dL    Creatinine 1.24 (H) 0.51 - 0.95 mg/dL    GFR Estimate 57 (L) >60 mL/min/1.73m2    Calcium 9.5 8.8 - 10.4 mg/dL    Glucose 105 (H) 70 - 99 mg/dL   Result Value Ref Range    Magnesium 2.2 1.7 - 2.3 mg/dL   Result Value Ref Range    Troponin T, High Sensitivity 18 (H) <=14 ng/L   HCG qualitative Blood   Result Value Ref Range    hCG Serum Qualitative Negative Negative   CBC with platelets and differential   Result Value Ref Range    WBC Count 6.4 4.0 - 11.0 10e3/uL    RBC Count 3.87 3.80 - 5.20 10e6/uL    Hemoglobin 12.4 11.7 - 15.7 g/dL    Hematocrit 35.9 35.0 - 47.0 %    MCV 93 78 - 100 fL    MCH 32.0 26.5 - 33.0 pg    MCHC 34.5 31.5 - 36.5 g/dL    RDW 12.6 10.0 - 15.0 %    Platelet Count 220 150 - 450  10e3/uL    % Neutrophils 47 %    % Lymphocytes 44 %    % Monocytes 6 %    % Eosinophils 2 %    % Basophils 1 %    % Immature Granulocytes 0 %    NRBCs per 100 WBC 0 <1 /100    Absolute Neutrophils 3.0 1.6 - 8.3 10e3/uL    Absolute Lymphocytes 2.8 0.8 - 5.3 10e3/uL    Absolute Monocytes 0.4 0.0 - 1.3 10e3/uL    Absolute Eosinophils 0.1 0.0 - 0.7 10e3/uL    Absolute Basophils 0.1 0.0 - 0.2 10e3/uL    Absolute Immature Granulocytes 0.0 <=0.4 10e3/uL    Absolute NRBCs 0.0 10e3/uL   Result Value Ref Range    Troponin T, High Sensitivity 17 (H) <=14 ng/L       RADIOLOGY:  Reviewed all pertinent imaging. Please see official radiology report.  XR Chest 2 Views   Final Result   IMPRESSION: Lungs are clear. No pleural effusion or pneumothorax. Normal heart size and pulmonary vascularity.          EKG:    Interpretation: Interpreted by me contemporaneously  Rate: 70  Rhythm is normal sinus rhythm  FL interval normal  QRS interval normal  QT interval normal  ST-T changes: Nonspecific ST changes, T wave inversions in the inferior and lateral leads without ST depression  Impression: Sinus rhythm, nonspecific ST changes, abnormal EKG      I have independently reviewed and interpreted the EKG(s) documented above.          Nii Nelson MD  [facility] EMERGENCY DEPARTMENT  [facility] [facility] [id number] [phone]   =================================================================    BILLING:  Data  Category 1  Non-ED record review, if applicable. External record reviewed: Reviewed recent office visit 09/17/2024 [facility] Visit     Clinical information was obtained from an independent historian. History was obtained from: Patient     The following testing was considered but ultimately not selected after discussion with patient/family: N/A     Category 2  My independent interpretation of EKG, rhythm strip, radiology study: Chest x-ray did not reveal large pneumothorax      Category 3  Discussion of management with other physician/healthcare provider/other source: N/A       Risk  Prescription medication was considered, but ultimately not given after discussion with patient/family: I considered ordering a prescription for narcotic pain medicine.  However, I feel patient's condition can be adequately treated with non-narcotic medications and that the risk of a narcotic pain medicine prescription outweighs the benefits.     Chronic conditions affecting care: Tobacco use and thyroid cancer, anemia, generalized anxiety disorder.     Care significantly affected by Social Determinants of Health: NA     Consideration of Admission/Observation: Escalation of care including admission/observation was considered given the complexity and risk of the patient's presenting complaint, exam findings, and/or their underlying comorbidities. However, ultimately I feel the patient is safe for outpatient management with close follow up. Reasoning: Work-up reassuring, does not reveal any acute life/organ threatening processes, patient's symptoms well controlled upon reevaluation, reexamination is reassuring, vitals are stable, patient agreeable with discharge, reliable for follow-up.   Negative delta troponin and low heart score and currently chest pain-free.  She is PERC negative and doubt aortic dissection or pulmonary embolism.  Will have her follow-up with cardiology and her PCP.  Patient agreeable plan.    [employer] Documentation:   CMS Diagnoses:               I, Dolores Anderson, am serving as a scribe to document services personally performed by Nii Nelson MD based on my observation and the provider's statements to me. I, Nii Nelson MD, attest that Dolores Anderson is acting in a scribe capacity, has observed my performance of the services and has documented them in accordance with my direction.     Nii Nelson MD  09/20/24 0219

## 2024-09-20 NOTE — ED NOTES
Patient discharged at 0225 to home; family providing transportation. Patient provided with all discharge paperwork and educational material. All questions answered to patient's satisfaction.

## 2024-09-20 NOTE — ED TRIAGE NOTES
Pt reports chest heaviness and SOB that started one hour ago. Pt reports feeling like her heart was racing but is WNL. Pt reports both arms being numb and tingly (has had those symptoms for a couple weeks.)      Triage Assessment (Adult)       Row Name 09/19/24 9234          Triage Assessment    Airway WDL WDL        Respiratory WDL    Respiratory WDL X;rhythm/pattern     Rhythm/Pattern, Respiratory shortness of breath        Skin Circulation/Temperature WDL    Skin Circulation/Temperature WDL WDL        Cardiac WDL    Cardiac WDL WDL        Peripheral/Neurovascular WDL    Peripheral Neurovascular WDL WDL        Cognitive/Neuro/Behavioral WDL    Cognitive/Neuro/Behavioral WDL WDL

## 2024-09-20 NOTE — DISCHARGE INSTRUCTIONS
You were evaluated in the Emergency Department today for chest pain. Your evaluation has shown no signs of medical conditions requiring emergent intervention at this time, however we recommend that you follow up with your primary care physician or your cardiologist as soon as possible for further testing as an outpatient.    Please schedule an appointment for follow up with your primary care physician as directed.  Please schedule an appointment with for follow-up with your cardiologist as directed.    Return to the Emergency Department if you experience worsening or uncontrolled chest pain, shortness of breath, lightheadedness, feeling faint, nausea, vomiting, or any other concerning symptoms.    Thank you for choosing us for your care.

## 2024-09-30 LAB
ATRIAL RATE - MUSE: 70 BPM
DIASTOLIC BLOOD PRESSURE - MUSE: NORMAL MMHG
INTERPRETATION ECG - MUSE: NORMAL
P AXIS - MUSE: 42 DEGREES
PR INTERVAL - MUSE: 148 MS
QRS DURATION - MUSE: 88 MS
QT - MUSE: 406 MS
QTC - MUSE: 438 MS
R AXIS - MUSE: 75 DEGREES
SYSTOLIC BLOOD PRESSURE - MUSE: NORMAL MMHG
T AXIS - MUSE: 268 DEGREES
VENTRICULAR RATE- MUSE: 70 BPM

## 2024-10-14 ENCOUNTER — APPOINTMENT (OUTPATIENT)
Dept: RADIOLOGY | Facility: HOSPITAL | Age: 39
End: 2024-10-14
Attending: EMERGENCY MEDICINE
Payer: COMMERCIAL

## 2024-10-14 ENCOUNTER — HOSPITAL ENCOUNTER (EMERGENCY)
Facility: HOSPITAL | Age: 39
Discharge: HOME | End: 2024-10-14
Attending: EMERGENCY MEDICINE | Admitting: EMERGENCY MEDICINE
Payer: COMMERCIAL

## 2024-10-14 VITALS
HEART RATE: 66 BPM | OXYGEN SATURATION: 97 % | HEIGHT: 65 IN | RESPIRATION RATE: 12 BRPM | DIASTOLIC BLOOD PRESSURE: 67 MMHG | TEMPERATURE: 97.3 F | WEIGHT: 150 LBS | BODY MASS INDEX: 24.99 KG/M2 | SYSTOLIC BLOOD PRESSURE: 104 MMHG

## 2024-10-14 DIAGNOSIS — R06.02 SHORTNESS OF BREATH: ICD-10-CM

## 2024-10-14 DIAGNOSIS — Z72.0 TOBACCO USE: ICD-10-CM

## 2024-10-14 DIAGNOSIS — R07.9 CHEST PAIN, UNSPECIFIED TYPE: ICD-10-CM

## 2024-10-14 LAB
ANION GAP SERPL CALCULATED.3IONS-SCNC: 11 MMOL/L (ref 7–15)
BUN SERPL-MCNC: 12 MG/DL (ref 6–20)
CALCIUM SERPL-MCNC: 8.6 MG/DL (ref 8.8–10.4)
CHLORIDE SERPL-SCNC: 104 MMOL/L (ref 98–107)
CREAT SERPL-MCNC: 0.89 MG/DL (ref 0.51–0.95)
D DIMER PPP FEU-MCNC: 0.49 UG/ML FEU (ref 0–0.5)
EGFRCR SERPLBLD CKD-EPI 2021: 85 ML/MIN/1.73M2
ERYTHROCYTE [DISTWIDTH] IN BLOOD BY AUTOMATED COUNT: 12.9 % (ref 10–15)
GLUCOSE SERPL-MCNC: 91 MG/DL (ref 70–99)
HCO3 SERPL-SCNC: 26 MMOL/L (ref 22–29)
HCT VFR BLD AUTO: 36 % (ref 35–47)
HGB BLD-MCNC: 12.6 G/DL (ref 11.7–15.7)
HOLD SPECIMEN: NORMAL
LIPASE SERPL-CCNC: 74 U/L (ref 13–60)
MCH RBC QN AUTO: 33 PG (ref 26.5–33)
MCHC RBC AUTO-ENTMCNC: 35 G/DL (ref 31.5–36.5)
MCV RBC AUTO: 94 FL (ref 78–100)
PLATELET # BLD AUTO: 229 10E3/UL (ref 150–450)
POTASSIUM SERPL-SCNC: 3.7 MMOL/L (ref 3.4–5.3)
RBC # BLD AUTO: 3.82 10E6/UL (ref 3.8–5.2)
SODIUM SERPL-SCNC: 141 MMOL/L (ref 135–145)
TROPONIN T SERPL HS-MCNC: 11 NG/L
TROPONIN T SERPL HS-MCNC: 12 NG/L
WBC # BLD AUTO: 7 10E3/UL (ref 4–11)

## 2024-10-14 PROCEDURE — 84484 ASSAY OF TROPONIN QUANT: CPT | Performed by: EMERGENCY MEDICINE

## 2024-10-14 PROCEDURE — 83690 ASSAY OF LIPASE: CPT | Performed by: EMERGENCY MEDICINE

## 2024-10-14 PROCEDURE — 93005 ELECTROCARDIOGRAM TRACING: CPT | Performed by: STUDENT IN AN ORGANIZED HEALTH CARE EDUCATION/TRAINING PROGRAM

## 2024-10-14 PROCEDURE — 36415 COLL VENOUS BLD VENIPUNCTURE: CPT | Performed by: EMERGENCY MEDICINE

## 2024-10-14 PROCEDURE — 96361 HYDRATE IV INFUSION ADD-ON: CPT

## 2024-10-14 PROCEDURE — 96375 TX/PRO/DX INJ NEW DRUG ADDON: CPT

## 2024-10-14 PROCEDURE — 258N000003 HC RX IP 258 OP 636: Performed by: EMERGENCY MEDICINE

## 2024-10-14 PROCEDURE — 96374 THER/PROPH/DIAG INJ IV PUSH: CPT

## 2024-10-14 PROCEDURE — 85379 FIBRIN DEGRADATION QUANT: CPT | Performed by: EMERGENCY MEDICINE

## 2024-10-14 PROCEDURE — 250N000013 HC RX MED GY IP 250 OP 250 PS 637: Performed by: EMERGENCY MEDICINE

## 2024-10-14 PROCEDURE — 80048 BASIC METABOLIC PNL TOTAL CA: CPT | Performed by: EMERGENCY MEDICINE

## 2024-10-14 PROCEDURE — 71046 X-RAY EXAM CHEST 2 VIEWS: CPT

## 2024-10-14 PROCEDURE — 250N000011 HC RX IP 250 OP 636: Performed by: EMERGENCY MEDICINE

## 2024-10-14 PROCEDURE — 99285 EMERGENCY DEPT VISIT HI MDM: CPT | Mod: 25

## 2024-10-14 PROCEDURE — 85027 COMPLETE CBC AUTOMATED: CPT | Performed by: EMERGENCY MEDICINE

## 2024-10-14 RX ORDER — ASPIRIN 81 MG/1
324 TABLET, CHEWABLE ORAL ONCE
Status: COMPLETED | OUTPATIENT
Start: 2024-10-14 | End: 2024-10-14

## 2024-10-14 RX ORDER — KETOROLAC TROMETHAMINE 15 MG/ML
15 INJECTION, SOLUTION INTRAMUSCULAR; INTRAVENOUS ONCE
Status: COMPLETED | OUTPATIENT
Start: 2024-10-14 | End: 2024-10-14

## 2024-10-14 RX ADMIN — SODIUM CHLORIDE 500 ML: 9 INJECTION, SOLUTION INTRAVENOUS at 04:33

## 2024-10-14 RX ADMIN — KETOROLAC TROMETHAMINE 15 MG: 15 INJECTION, SOLUTION INTRAMUSCULAR; INTRAVENOUS at 04:34

## 2024-10-14 RX ADMIN — ASPIRIN 81 MG CHEWABLE TABLET 324 MG: 81 TABLET CHEWABLE at 04:34

## 2024-10-14 RX ADMIN — FAMOTIDINE 20 MG: 10 INJECTION, SOLUTION INTRAVENOUS at 04:34

## 2024-10-14 ASSESSMENT — COLUMBIA-SUICIDE SEVERITY RATING SCALE - C-SSRS
2. HAVE YOU ACTUALLY HAD ANY THOUGHTS OF KILLING YOURSELF IN THE PAST MONTH?: NO
6. HAVE YOU EVER DONE ANYTHING, STARTED TO DO ANYTHING, OR PREPARED TO DO ANYTHING TO END YOUR LIFE?: NO
1. IN THE PAST MONTH, HAVE YOU WISHED YOU WERE DEAD OR WISHED YOU COULD GO TO SLEEP AND NOT WAKE UP?: NO

## 2024-10-14 ASSESSMENT — ACTIVITIES OF DAILY LIVING (ADL)
ADLS_ACUITY_SCORE: 35

## 2024-10-14 NOTE — ED PROVIDER NOTES
EMERGENCY DEPARTMENT ENCOUNTER      NAME: Ainsley Tran  AGE: 39 year old female  YOB: 1985  MRN: [id number]  EVALUATION DATE & TIME: No admission date for patient encounter.    ED PROVIDER: Lupe Gilliam MD    Chief Complaint   Patient presents with    Chest Pain    Breathing Problem       FINAL IMPRESSION  1. Chest pain, unspecified type    2. Shortness of breath    3. Tobacco use        MEDICAL DECISION MAKING   Ainsley Tran is a 39 year old female who presents for evaluation of chest pain.  Outside records reviewed.  Patient has a history of thyroid cancer, anxiety, and tobacco use.  She follows with endocrinology given history of thyroid cancer and with hypothyroidism.  She was seen here in the ED on 9/19/2024 with complaints of chest pain and shortness of breath.  Workup at that time was reassuring including labs, EKG, and chest x-ray.  She returns today with complaints of symptoms that began around 10 PM last night, resolved, then awoke her from sleep around 3 in the morning.  She describes central chest pressure with associated diaphoresis and dyspnea.  No recent fever, cough, congestion, abdominal pain, nausea, or vomiting.  Patient does smoke cigarettes.  She does not have a personal history of cardiac disease or blood clots, asthma or chronic acid reflux.  She reports history of cardiac disease in her grandpa.    I considered a broad differential including but not limited to ACS/ischemia, unstable angina, CHF, pericarditis, myocarditis, pericardial effusion, PE, viral illness, pneumonia, aortic dissection, pneumothorax, costochondritis, pleurisy, GERD, muscular strain/sprain, anxiety, anemia. No hypoxia, pleuritic pain, tachypnea, or other 2/2 to suggest PE. Unable to PERC out 2/2 to  tobacco use but is low risk by Wells. Low suspicion for AAA/dissection given history and exam. Low risk by HEART score, assuming normal troponin.  Discussed options for COVID management with the  patient.  We have agreed on plan for labs, EKG, chest x-ray versus CT scan depending on results of D-dimer, and management of symptoms with IV analgesic/antiemetic as well as aspirin.  ECG on arrival was reassuring without evidence of arrhythmia or ischemia.     ED Course as of 10/14/24 0711   Mon Oct 14, 2024   0501 D-Dimer Quantitative: 0.49  D-dimer negative. With this, I have low suspicion for PE and do not believe further workup with CT PE study necessary. Will proceed with CXR.     0501 Basic metabolic panel(!)  BMP reassuring. No evidence of KONG, acidosis, or significant electrolyte derangement.    0501 Troponin T, High Sensitivity: 11  Slightly over negative. ECG reassuring but given timing of symptoms will repeat at 2 hours to rule out ACS.   0501 CBC (+ platelets, no diff)  CBC reassuring. No evidence of leukocytosis to suggest systemic infectious/inflammatory process. No acute anemia. PLTs wnl.    0501 Lipase(!): 74  Minimally elevated, does not meet criteria for acute pancreatitis.   0549 XR Chest 2 Views  I independently reviewed chest x-ray which showed no obvious pneumothorax, infiltrate, pulmonary edema, or displaced rib fractures.     0700 Troponin T, High Sensitivity: 12  Repeat troponin stable, unlikely ACS.     I rechecked the patient multiple times and reviewed results.  She remained comfortable and had improvement in symptoms after initial interventions.  At this point, etiology of discomfort unclear but I see no evidence of acute hematologic, metabolic, cardiopulmonary process requiring further workup/management on an emergent basis.  Given this is her second episode of similar symptoms, I did offer to place referral to rapid access clinic which she was interested in.  In the meantime, recommended that she try taking a PPI daily for the next 2 weeks in case her symptoms may be related to acid reflux.  Also advised continued use of Tylenol/Motrin and consideration of tobacco cessation.    At  the end of the encounter, we reviewed the results, potential diagnoses, as well as return precautions and recommendations for follow up. I instructed Ms. Tran to return to the emergency department immediately if she develops any new or worsening symptoms and provided additional verbal discharge instructions. Ms. Tran expressed understanding and agreement with this plan of care, her questions were answered, and she was discharged in stable condition.      Considerations in Medical Decision Making  History:  Supplemental history obtained?: Documented in chart and Friend  Reviewed external records: External records reviewed?: Documented in chart  Care impacted by chronic illness: Documented in Chart, Cancer/Chemotherapy, and Mental Health    Work Up:  In additional to work up documented, I considered the following work up: Documented in chart, if applicable.  Chart documentation includes differential considered and any EKGs or imaging independently interpreted by provider, where specified.    External consultation:  Discussion of management with another provider: Documented in chart, if applicable    Disposition considerations: Discharge. I prescribed additional prescription strength medication(s) as charted. I considered admission, but discharged patient after significant clinical improvement.    MIPS Documentation: Not Applicable       ED COURSE  4:20 AM I met with the patient to gather history and to perform my initial exam. We discussed plans for the ED course, including diagnostic testing and treatment.    7:00 AM I rechecked the patient and discussed further plan of care.       MEDICATIONS GIVEN IN THE ED  Medications   aspirin (ASA) chewable tablet 324 mg (324 mg Oral $Given 10/14/24 0434)   sodium chloride 0.9% BOLUS 500 mL (0 mLs Intravenous Stopped 10/14/24 0515)   famotidine (PEPCID) injection 20 mg (20 mg Intravenous $Given 10/14/24 0434)   ketorolac (TORADOL) injection 15 mg (15 mg Intravenous  $Given 10/14/24 9495)       NEW PRESCRIPTIONS STARTED AT TODAY'S VISIT  New Prescriptions    OMEPRAZOLE (PRILOSEC) 20 MG DR CAPSULE    Take 1 capsule (20 mg) by mouth daily.          =================================================================    HPI:    Use of : N/A      Ainsley Tran is a 39 year old female with a pertinent medical history of thyroid cancer, anemia, BRENDEN, ADHD, who presents chest pain and shortness of breath.     Patient states that yesterday she was at baseline and in her normal state of health and went to bed at approximately 9:00 PM, and she reports that then at approximately 3:30 AM today she woke up diaphoretic and with constant substernal chest pain with associated shortness of breath. She denies taking any medications for her symptoms.     Patient endorses regularly smoking cigarettes. She denies taking any birth control. She denies any known PMH of asthma or acid reflux. She endorses her paternal grandfather dying of heart disease. She denies any recent vomiting, leg pain, leg swelling, fevers, cough, or any other complications at this time.     Per Chart Review, patient was seen at the [facility] Emergency Department on 09/19/24 for evaluation of chest pain and shortness of breath for approximately 1 hour. EKG showed nonspecific T wave abnormalities. T wave inversions in the inferior leads without significant ST elevation. Nonspecific. Didnot show signs of arrhythmia.  Specifically no delta waves, epsilon waves, dagger Q waves or signs of heart block. Initial troponin was mildly elevated. Repeat troponin did not significantly increase. Chest XR was normal. Patient's heart score was 3 and improved upon recheck, so patient was discharged to home with instructions to follow-up with her PCP and with a cardiology referral.         RELEVANT HISTORY, MEDICATIONS, & ALLERGIES   Past medical history, surgical history, family history, medications, and  "allergies reviewed and pertinent noted in HPI.    REVIEW OF SYSTEMS:  A complete review of systems was performed with pertinent positives and negatives noted in the HPI. All other systems negative.     PHYSICAL EXAM:    Vitals: /67   Pulse 73   Temp 97.3  F (36.3  C)   Resp 14   Ht 1.651 m (5' 5\")   Wt 68 kg (150 lb)   SpO2 99%   BMI 24.96 kg/m     General: Alert and interactive, comfortable appearing.  HENT: Atraumatic. Full AROM of neck. MMM.  Cardiovascular: Regular rate and rhythm.   Chest/Pulmonary: Normal work of breathing. Speaking in complete sentences. Lungs CTAB. No chest wall tenderness or deformities.  Abdomen: Soft, nondistended. Nontender without guarding or rebound.  Extremities: Normal AROM of all major joints.  Skin: Warm and dry. Normal skin color.   Neuro: Speech clear. CNs grossly intact. Moves all extremities spontaneously.   Psych: Normal affect/mood, cooperative, memory appropriate.      LAB  Labs Ordered and Resulted from Time of ED Arrival to Time of ED Departure   BASIC METABOLIC PANEL - Abnormal       Result Value    Sodium 141      Potassium 3.7      Chloride 104      Carbon Dioxide (CO2) 26      Anion Gap 11      Urea Nitrogen 12.0      Creatinine 0.89      GFR Estimate 85      Calcium 8.6 (*)     Glucose 91     LIPASE - Abnormal    Lipase 74 (*)    D DIMER QUANTITATIVE - Normal    D-Dimer Quantitative 0.49     CBC WITH PLATELETS - Normal    WBC Count 7.0      RBC Count 3.82      Hemoglobin 12.6      Hematocrit 36.0      MCV 94      MCH 33.0      MCHC 35.0      RDW 12.9      Platelet Count 229     TROPONIN T, HIGH SENSITIVITY - Normal    Troponin T, High Sensitivity 11     TROPONIN T, HIGH SENSITIVITY - Normal    Troponin T, High Sensitivity 12         RADIOLOGY  XR Chest 2 Views   Final Result   IMPRESSION: Negative chest.          EKG  Performed at: 0426  Impression: NSR.  No acute ischemic changes.  Normal intervals.  Compared to previous, T waves now upright in inferior " and anterolateral leads.  Rate: 81  Rhythm: Sinus  QRS Interval: 80  QTc Interval: 434  Comparison: 9/19/2024    All laboratory and imaging results and EKG's were personally reviewed and interpreted by myself prior to disposition decision.           I, Tigre Morrison, am serving as a scribe to document services personally performed by Dr. Lupe Gilliam based on my observation and the provider's statements to me. I, Lupe Gilliam MD attest that Tigre Morrison is acting in a scribe capacity, has observed my performance of the services and has documented them in accordance with my direction.    Lupe Gilliam M.D.  Emergency Medicine  [facility] EMERGENCY DEPARTMENT  [facility] [facility] [address]  [phone]  Dept: [phone]       Lupe Gilliam MD  10/14/24 0711

## 2024-10-14 NOTE — DISCHARGE INSTRUCTIONS
You were seen in the emergency department for chest pain and shortness of breath.    Like we talked about, I did recommend that you try taking an antacid medicine for the next 2 weeks to see if this helps.  I am sending you with a prescription but you can also pick this up over-the-counter.  Also recommend that you use Tylenol/Motrin and consider working on tobacco cessation, as smoking does increase your risk of cardiac and lung disease in the long-term.    Please return to the Emergency Department immediately if you experience chest pain, difficulty breathing, and/or if your symptoms get worse, do not improve, or with any new concerns. Otherwise, please follow up with your primary physician within the next 2-3 days for recheck and ongoing management.    I am sending you with a referral to see cardiology through the rapid access clinic. Someone should be calling you to arrange a follow up appointment.     Below is some information that you might find informative and useful.     Thank you for choosing [facility]. It was a pleasure taking care of you today!  -Dr. Lupe Gilliam

## 2024-10-14 NOTE — ED TRIAGE NOTES
Patient reports CP and SOB that started 2100. She went to sleep and the CP woke her up about 0345. No cardiac  history.      Triage Assessment (Adult)       Row Name 10/14/24 5064          Triage Assessment    Airway WDL WDL        Respiratory WDL    Respiratory WDL X        Cardiac WDL    Cardiac WDL X        Cognitive/Neuro/Behavioral WDL    Cognitive/Neuro/Behavioral WDL WDL

## 2024-10-22 LAB
ATRIAL RATE - MUSE: 81 BPM
DIASTOLIC BLOOD PRESSURE - MUSE: NORMAL MMHG
INTERPRETATION ECG - MUSE: NORMAL
P AXIS - MUSE: 68 DEGREES
PR INTERVAL - MUSE: 132 MS
QRS DURATION - MUSE: 80 MS
QT - MUSE: 374 MS
QTC - MUSE: 434 MS
R AXIS - MUSE: 62 DEGREES
SYSTOLIC BLOOD PRESSURE - MUSE: NORMAL MMHG
T AXIS - MUSE: 70 DEGREES
VENTRICULAR RATE- MUSE: 81 BPM

## 2024-12-01 ENCOUNTER — HEALTH MAINTENANCE LETTER (OUTPATIENT)
Age: 39
End: 2024-12-01